# Patient Record
Sex: FEMALE | Race: WHITE | NOT HISPANIC OR LATINO | URBAN - METROPOLITAN AREA
[De-identification: names, ages, dates, MRNs, and addresses within clinical notes are randomized per-mention and may not be internally consistent; named-entity substitution may affect disease eponyms.]

---

## 2018-06-29 ENCOUNTER — OFFICE VISIT (OUTPATIENT)
Dept: URBAN - METROPOLITAN AREA CLINIC 48 | Facility: CLINIC | Age: 78
End: 2018-06-29
Payer: MEDICARE

## 2018-06-29 DIAGNOSIS — H35.3213 EXUDATIVE AGE-REL MCLR DEGN, RIGHT EYE, WITH INACTIVE SCAR: Primary | ICD-10-CM

## 2018-06-29 PROCEDURE — 99213 OFFICE O/P EST LOW 20 MIN: CPT | Performed by: OPHTHALMOLOGY

## 2018-06-29 PROCEDURE — 92134 CPTRZ OPH DX IMG PST SGM RTA: CPT | Performed by: OPHTHALMOLOGY

## 2018-06-29 ASSESSMENT — INTRAOCULAR PRESSURE
OD: 13
OS: 14

## 2018-06-29 NOTE — IMPRESSION/PLAN
Impression: Exdtve age-rel mclr degn, left eye, with inact chrdl neovas: H35.3222. OS. Condition: established, stable. Plan: OCT ordered and performed today,  Discussed diagnosis in detail with patient. Discussed treatment options with patient. Discussed risks and benefits and patient understands. Discussed possible injection at next visit. Decided to hold off this month and re -evaluate next month for possible continued injection.

## 2018-06-29 NOTE — IMPRESSION/PLAN
Impression: Exudative age-rel mclr degn, right eye, with inactive scar: H35.7169. Plan: OCT ordered and performed today. Discussed diagnosis with patient. 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 There is currently no effective treatment for sub retinal scarring from a choroidal  neovascular membrane. Recommend observation and amsler grid monitoring. To aide and detection of recurrent Choroidal Neovascularization.

## 2018-07-27 ENCOUNTER — OFFICE VISIT (OUTPATIENT)
Dept: URBAN - METROPOLITAN AREA CLINIC 48 | Facility: CLINIC | Age: 78
End: 2018-07-27
Payer: MEDICARE

## 2018-07-27 PROCEDURE — 92134 CPTRZ OPH DX IMG PST SGM RTA: CPT | Performed by: OPHTHALMOLOGY

## 2018-07-27 PROCEDURE — 99213 OFFICE O/P EST LOW 20 MIN: CPT | Performed by: OPHTHALMOLOGY

## 2018-07-27 ASSESSMENT — INTRAOCULAR PRESSURE
OS: 14
OD: 13

## 2018-07-27 NOTE — IMPRESSION/PLAN
Impression: Exudative age-rel mclr degn, right eye, with inactive scar: H35.4157. Plan: OCT ordered and performed today. Discussed diagnosis with patient. 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 There is currently no effective treatment for sub retinal scarring from a choroidal  neovascular membrane. Recommend observation and amsler grid monitoring. To aide and detection of recurrent Choroidal Neovascularization.

## 2018-07-27 NOTE — IMPRESSION/PLAN
Impression: Exdtve age-rel mclr degn, left eye, with inact chrdl neovas: H35.4782. Plan: OCT ordered and performed today. Discussed diagnosis with patient, OCT demonstrates pigment atrophy, Additional treatment is not recommended at this time but we will continue to monitor frequently. Patient to call if vision worsens. Patient to check vision one eye at a time. Patient understands and agrees with plan.

## 2018-10-05 ENCOUNTER — OFFICE VISIT (OUTPATIENT)
Dept: URBAN - METROPOLITAN AREA CLINIC 48 | Facility: CLINIC | Age: 78
End: 2018-10-05
Payer: MEDICARE

## 2018-10-05 PROCEDURE — 92134 CPTRZ OPH DX IMG PST SGM RTA: CPT | Performed by: OPHTHALMOLOGY

## 2018-10-05 PROCEDURE — 99213 OFFICE O/P EST LOW 20 MIN: CPT | Performed by: OPHTHALMOLOGY

## 2018-10-05 ASSESSMENT — INTRAOCULAR PRESSURE
OD: 14
OS: 15

## 2018-10-05 NOTE — IMPRESSION/PLAN
Impression: Exdtve age-rel mclr degn, left eye, with inact chrdl neovas: H35.9012. Plan: OCT ordered and performed today. Discussed diagnosis with patient, OCT demonstrates pigment atrophy, Additional treatment is not recommended at this time but we will continue to monitor frequently. Patient to call if vision worsens. Patient to check vision one eye at a time. Patient understands and agrees with plan.

## 2019-01-11 ENCOUNTER — OFFICE VISIT (OUTPATIENT)
Dept: URBAN - METROPOLITAN AREA CLINIC 48 | Facility: CLINIC | Age: 79
End: 2019-01-11
Payer: MEDICARE

## 2019-01-11 PROCEDURE — 99213 OFFICE O/P EST LOW 20 MIN: CPT | Performed by: OPHTHALMOLOGY

## 2019-01-11 PROCEDURE — 92134 CPTRZ OPH DX IMG PST SGM RTA: CPT | Performed by: OPHTHALMOLOGY

## 2019-01-11 PROCEDURE — 67028 INJECTION EYE DRUG: CPT | Performed by: OPHTHALMOLOGY

## 2019-01-11 ASSESSMENT — INTRAOCULAR PRESSURE
OD: 15
OS: 16

## 2019-01-11 NOTE — IMPRESSION/PLAN
Impression: Exudative age-rel mclr degn, right eye, with inactive scar: H35.7955. Plan: OCT ordered and performed today. Discussed diagnosis with patient. 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 There is currently no effective treatment for sub retinal scarring from a choroidal  neovascular membrane. Recommend observation and amsler grid monitoring. To aide and detection of recurrent Choroidal Neovascularization.

## 2019-01-11 NOTE — IMPRESSION/PLAN
Impression: Exdtve age-rel mclr degn, left eye, with actv chrdl neovas: H35.3221. Plan: OCT ordered and performed today. Discussed diagnosis in detail with patient. Discussed treatment options with patient. Discussed risks and benefits and patient understands. Recommend a one time Eylea injection in the left eye today. Discussed all the R/b/a's with patient and she elects to proceed with Eylea injection today in the left eye.

## 2019-03-08 ENCOUNTER — OFFICE VISIT (OUTPATIENT)
Dept: URBAN - METROPOLITAN AREA CLINIC 48 | Facility: CLINIC | Age: 79
End: 2019-03-08
Payer: MEDICARE

## 2019-03-08 PROCEDURE — 92134 CPTRZ OPH DX IMG PST SGM RTA: CPT | Performed by: OPHTHALMOLOGY

## 2019-03-08 PROCEDURE — 67028 INJECTION EYE DRUG: CPT | Performed by: OPHTHALMOLOGY

## 2019-03-08 PROCEDURE — 99213 OFFICE O/P EST LOW 20 MIN: CPT | Performed by: OPHTHALMOLOGY

## 2019-03-08 ASSESSMENT — INTRAOCULAR PRESSURE
OD: 14
OS: 12

## 2019-03-08 NOTE — IMPRESSION/PLAN
Impression: Exdtve age-rel mclr degn, left eye, with actv chrdl neovas: H35.3221. OS. Plan: OCT ordered and performed today. Discussed diagnosis in detail with patient. Discussed treatment options with patient. Discussed risks and benefits and patient understands. Recommend an Eylea injection in the left eye tpoday with a follow up in 8 weeks. All the R/b/a's were discussed with patient, patient elects to proceed with Eylea OS.

## 2019-03-08 NOTE — IMPRESSION/PLAN
Impression: Exudative age-rel mclr degn, right eye, with inactive scar: H35.7316. Plan: OCT ordered and performed today. Discussed diagnosis with patient. 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 There is currently no effective treatment for sub retinal scarring from a choroidal  neovascular membrane. Recommend observation and amsler grid monitoring. To aide and detection of recurrent Choroidal Neovascularization.

## 2019-05-03 ENCOUNTER — PROCEDURE (OUTPATIENT)
Dept: URBAN - METROPOLITAN AREA CLINIC 48 | Facility: CLINIC | Age: 79
End: 2019-05-03
Payer: MEDICARE

## 2019-05-03 PROCEDURE — 67028 INJECTION EYE DRUG: CPT | Performed by: OPHTHALMOLOGY

## 2019-07-03 ENCOUNTER — PROCEDURE (OUTPATIENT)
Dept: URBAN - METROPOLITAN AREA CLINIC 48 | Facility: CLINIC | Age: 79
End: 2019-07-03
Payer: MEDICARE

## 2019-07-03 PROCEDURE — 67028 INJECTION EYE DRUG: CPT | Performed by: OPHTHALMOLOGY

## 2019-09-06 ENCOUNTER — OFFICE VISIT (OUTPATIENT)
Dept: URBAN - METROPOLITAN AREA CLINIC 48 | Facility: CLINIC | Age: 79
End: 2019-09-06
Payer: MEDICARE

## 2019-09-06 PROCEDURE — 67028 INJECTION EYE DRUG: CPT | Performed by: OPHTHALMOLOGY

## 2019-09-06 PROCEDURE — 92134 CPTRZ OPH DX IMG PST SGM RTA: CPT | Performed by: OPHTHALMOLOGY

## 2019-09-06 PROCEDURE — 99213 OFFICE O/P EST LOW 20 MIN: CPT | Performed by: OPHTHALMOLOGY

## 2019-09-06 ASSESSMENT — INTRAOCULAR PRESSURE
OD: 15
OS: 12

## 2019-09-06 NOTE — IMPRESSION/PLAN
Impression: Exudative age-rel mclr degn, right eye, with inactive scar: H35.2547. Plan: OCT ordered and performed today. Discussed diagnosis with patient. 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 There is currently no effective treatment for sub retinal scarring from a choroidal  neovascular membrane. Recommend observation and amsler grid monitoring. To aide and detection of recurrent Choroidal Neovascularization.

## 2019-09-06 NOTE — IMPRESSION/PLAN
Impression: Exdtve age-rel mclr degn, left eye, with actv chrdl neovas: H35.3221. Plan: OCT ordered and performed today. Discussed diagnosis in detail with patient. Discussed treatment options with patient. Discussed risks and benefits and patient understands. A 3 month series of eylea Intirivitreal injection's, 1 EVERY MONTH  #1 today then #2 in 1 month, #3 in 2 month's, Then perform DE/OCT in 3 month's.

## 2019-10-04 ENCOUNTER — PROCEDURE (OUTPATIENT)
Dept: URBAN - METROPOLITAN AREA CLINIC 48 | Facility: CLINIC | Age: 79
End: 2019-10-04
Payer: MEDICARE

## 2019-10-04 PROCEDURE — 67028 INJECTION EYE DRUG: CPT | Performed by: OPHTHALMOLOGY

## 2019-11-15 ENCOUNTER — PROCEDURE (OUTPATIENT)
Dept: URBAN - METROPOLITAN AREA CLINIC 48 | Facility: CLINIC | Age: 79
End: 2019-11-15
Payer: MEDICARE

## 2019-11-15 PROCEDURE — 67028 INJECTION EYE DRUG: CPT | Performed by: OPHTHALMOLOGY

## 2019-12-13 ENCOUNTER — OFFICE VISIT (OUTPATIENT)
Dept: URBAN - METROPOLITAN AREA CLINIC 48 | Facility: CLINIC | Age: 79
End: 2019-12-13
Payer: MEDICARE

## 2019-12-13 DIAGNOSIS — H26.491 OTHER SECONDARY CATARACT, RIGHT EYE: ICD-10-CM

## 2019-12-13 PROCEDURE — 92134 CPTRZ OPH DX IMG PST SGM RTA: CPT | Performed by: OPHTHALMOLOGY

## 2019-12-13 PROCEDURE — 92014 COMPRE OPH EXAM EST PT 1/>: CPT | Performed by: OPHTHALMOLOGY

## 2019-12-13 ASSESSMENT — INTRAOCULAR PRESSURE
OD: 13
OS: 12

## 2019-12-13 NOTE — IMPRESSION/PLAN
Impression: Other secondary cataract, right eye: H26.491. Plan: Pending Anterior PCO, Dr. Caitlin Amador for a second opinion. 

RTC  6 months yag eval ( [facility] exam)

## 2019-12-13 NOTE — IMPRESSION/PLAN
Impression: Exdtve age-rel mclr degn, left eye, with actv chrdl neovas: H35.3221. Plan: Discussed diagnosis and OCT with patient. No injection today. Patient has appointment with Dr. Jose E Cody.

## 2020-01-08 ENCOUNTER — OFFICE VISIT (OUTPATIENT)
Dept: URBAN - METROPOLITAN AREA CLINIC 48 | Facility: CLINIC | Age: 80
End: 2020-01-08
Payer: MEDICARE

## 2020-01-08 PROCEDURE — 92134 CPTRZ OPH DX IMG PST SGM RTA: CPT | Performed by: OPHTHALMOLOGY

## 2020-01-08 PROCEDURE — 99213 OFFICE O/P EST LOW 20 MIN: CPT | Performed by: OPHTHALMOLOGY

## 2020-01-08 ASSESSMENT — INTRAOCULAR PRESSURE
OD: 12
OS: 12

## 2020-01-08 NOTE — IMPRESSION/PLAN
Impression: Exdtve age-rel mclr degn, left eye, with inact chrdl neovas: H35.3222.
s/p Eylea 11/15/2019 Plan: OCT ordered and performed today. Discussed diagnosis with patient, OCT demonstrates pigment atrophy, Additional treatment is not recommended at this time but we will continue to monitor frequently. Patient to call if vision worsens. Patient to check vision one eye at a time. Patient understands and agrees with plan.

## 2020-01-08 NOTE — IMPRESSION/PLAN
Impression: Exudative age-rel mclr degn, right eye, with inactive scar: H35.2633. little inter retina heme may pertain to vein occlusion  Plan: OCT ordered and performed today. Discussed diagnosis with patient. 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 There is currently no effective treatment for sub retinal scarring from a choroidal  neovascular membrane. Recommend observation and amsler grid monitoring. To aide and detection of recurrent Choroidal Neovascularization.

## 2020-02-21 ENCOUNTER — OFFICE VISIT (OUTPATIENT)
Dept: URBAN - METROPOLITAN AREA CLINIC 48 | Facility: CLINIC | Age: 80
End: 2020-02-21
Payer: MEDICARE

## 2020-02-21 PROCEDURE — 92134 CPTRZ OPH DX IMG PST SGM RTA: CPT | Performed by: OPHTHALMOLOGY

## 2020-02-21 PROCEDURE — 99213 OFFICE O/P EST LOW 20 MIN: CPT | Performed by: OPHTHALMOLOGY

## 2020-02-21 ASSESSMENT — INTRAOCULAR PRESSURE
OD: 15
OS: 13

## 2020-02-21 NOTE — IMPRESSION/PLAN
Impression: Exudative age-rel mclr degn, right eye, with inactive scar: H35.7473. little inter retina heme may pertain to vein occlusion Plan: OCT ordered and performed today. Discussed diagnosis with patient. 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 There is currently no effective treatment for sub retinal scarring from a choroidal  neovascular membrane. Recommend observation and amsler grid monitoring. To aide and detection of recurrent Choroidal Neovascularization.

## 2020-03-18 ENCOUNTER — OFFICE VISIT (OUTPATIENT)
Dept: URBAN - METROPOLITAN AREA CLINIC 48 | Facility: CLINIC | Age: 80
End: 2020-03-18
Payer: MEDICARE

## 2020-03-18 PROCEDURE — 92134 CPTRZ OPH DX IMG PST SGM RTA: CPT | Performed by: OPHTHALMOLOGY

## 2020-03-18 PROCEDURE — 67028 INJECTION EYE DRUG: CPT | Performed by: OPHTHALMOLOGY

## 2020-03-18 PROCEDURE — 99213 OFFICE O/P EST LOW 20 MIN: CPT | Performed by: OPHTHALMOLOGY

## 2020-03-18 ASSESSMENT — INTRAOCULAR PRESSURE
OS: 14
OD: 15

## 2020-03-18 NOTE — IMPRESSION/PLAN
Impression: Exdtve age-rel mclr degn, left eye, with actv chrdl neovas: H35.3221. OS.
S/p Eylea 11/2019 OS Plan: OCT ordered and performed today. Discussed diagnosis in detail with patient. Discussed treatment options with patient. Discussed risks and benefits and patient understands. Recommend a one time Eylea intravitreal injection in the left eye today. Patient understands and agrees with plan.

## 2020-03-18 NOTE — IMPRESSION/PLAN
Impression: Exudative age-rel mclr degn, right eye, with inactive scar: H35.5353. OD. Plan: OCT ordered and performed today. Discussed diagnosis with patient. 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 There is currently no effective treatment for sub retinal scarring from a choroidal  neovascular membrane. Recommend observation and amsler grid monitoring. To aide and detection of recurrent Choroidal Neovascularization.

## 2020-06-26 ENCOUNTER — OFFICE VISIT (OUTPATIENT)
Dept: URBAN - METROPOLITAN AREA CLINIC 48 | Facility: CLINIC | Age: 80
End: 2020-06-26
Payer: MEDICARE

## 2020-06-26 DIAGNOSIS — H35.3222 EXDTVE AGE-REL MCLR DEGN, LEFT EYE, WITH INACT CHRDL NEOVAS: Primary | ICD-10-CM

## 2020-06-26 PROCEDURE — 99213 OFFICE O/P EST LOW 20 MIN: CPT | Performed by: OPHTHALMOLOGY

## 2020-06-26 PROCEDURE — 92134 CPTRZ OPH DX IMG PST SGM RTA: CPT | Performed by: OPHTHALMOLOGY

## 2020-06-26 ASSESSMENT — INTRAOCULAR PRESSURE
OS: 14
OD: 14

## 2020-06-26 NOTE — IMPRESSION/PLAN
Impression: Exudative age-rel mclr degn, right eye, with inactive scar: H35.3213. little inter retina heme may pertain to vein occlusion Plan: OCT ordered and performed today. Discussed diagnosis with patient. 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 There is currently no effective treatment for sub retinal scarring from a choroidal  neovascular membrane. Recommend observation and amsler grid monitoring.

## 2020-06-26 NOTE — IMPRESSION/PLAN
Impression: Exdtve age-rel mclr degn, left eye, with inact chrdl neovas: H35.7662.
s/p Eylea Plan: OCT ordered and performed today. Discussed diagnosis with patient, OCT demonstrates pigment atrophy, Additional treatment is not recommended at this time but we will continue to monitor frequently. Patient to call if vision worsens. Patient to check vision one eye at a time. Patient understands and agrees with plan.

## 2020-10-30 ENCOUNTER — OFFICE VISIT (OUTPATIENT)
Dept: URBAN - METROPOLITAN AREA CLINIC 48 | Facility: CLINIC | Age: 80
End: 2020-10-30
Payer: MEDICARE

## 2020-10-30 PROCEDURE — 67028 INJECTION EYE DRUG: CPT | Performed by: OPHTHALMOLOGY

## 2020-10-30 PROCEDURE — 92134 CPTRZ OPH DX IMG PST SGM RTA: CPT | Performed by: OPHTHALMOLOGY

## 2020-10-30 PROCEDURE — 99213 OFFICE O/P EST LOW 20 MIN: CPT | Performed by: OPHTHALMOLOGY

## 2020-10-30 ASSESSMENT — INTRAOCULAR PRESSURE
OD: 12
OS: 16

## 2020-10-30 NOTE — IMPRESSION/PLAN
Impression: Exdtve age-rel mclr degn, left eye, with actv chrdl neovas: H35.3221. OS. Plan: OCT ordered and performed today. Discussed diagnosis in detail with patient. Discussed treatment options with patient. Discussed risks and benefits and patient understands. Recommend an one time Eylea injection in the left eye today, patient elects to proceed with Eylea OS.

## 2020-12-11 ENCOUNTER — OFFICE VISIT (OUTPATIENT)
Dept: URBAN - METROPOLITAN AREA CLINIC 48 | Facility: CLINIC | Age: 80
End: 2020-12-11
Payer: MEDICARE

## 2020-12-11 PROCEDURE — 67028 INJECTION EYE DRUG: CPT | Performed by: OPHTHALMOLOGY

## 2020-12-11 PROCEDURE — 99213 OFFICE O/P EST LOW 20 MIN: CPT | Performed by: OPHTHALMOLOGY

## 2020-12-11 PROCEDURE — 92134 CPTRZ OPH DX IMG PST SGM RTA: CPT | Performed by: OPHTHALMOLOGY

## 2020-12-11 ASSESSMENT — INTRAOCULAR PRESSURE
OD: 14
OS: 12

## 2020-12-11 NOTE — IMPRESSION/PLAN
Impression: Exudative age-related macular degeneration, left eye, with active choroidal neovascularization: H35.3221. Left. Plan: OCT ordered and performed today. Discussed diagnosis in detail with patient. Discussed treatment options with patient. Discussed risks and benefits and patient understands. A 3 month series of Eylea intravitreal injection's in the left eye, 1 EVERY MONTH  #1 today then #2 in 1 month, #3 in 2 month's, Then perform DE/OCT in 3 month's.

## 2021-01-22 ENCOUNTER — PROCEDURE (OUTPATIENT)
Dept: URBAN - METROPOLITAN AREA CLINIC 48 | Facility: CLINIC | Age: 81
End: 2021-01-22
Payer: MEDICARE

## 2021-01-22 PROCEDURE — 67028 INJECTION EYE DRUG: CPT | Performed by: OPHTHALMOLOGY

## 2021-02-19 ENCOUNTER — PROCEDURE (OUTPATIENT)
Dept: URBAN - METROPOLITAN AREA CLINIC 48 | Facility: CLINIC | Age: 81
End: 2021-02-19
Payer: MEDICARE

## 2021-02-19 PROCEDURE — 67028 INJECTION EYE DRUG: CPT | Performed by: OPHTHALMOLOGY

## 2021-03-31 ENCOUNTER — OFFICE VISIT (OUTPATIENT)
Dept: URBAN - METROPOLITAN AREA CLINIC 48 | Facility: CLINIC | Age: 81
End: 2021-03-31
Payer: MEDICARE

## 2021-03-31 PROCEDURE — 92134 CPTRZ OPH DX IMG PST SGM RTA: CPT | Performed by: OPHTHALMOLOGY

## 2021-03-31 PROCEDURE — 99213 OFFICE O/P EST LOW 20 MIN: CPT | Performed by: OPHTHALMOLOGY

## 2021-03-31 PROCEDURE — 67028 INJECTION EYE DRUG: CPT | Performed by: OPHTHALMOLOGY

## 2021-03-31 ASSESSMENT — INTRAOCULAR PRESSURE
OS: 10
OD: 17

## 2021-03-31 NOTE — IMPRESSION/PLAN
Impression: Exudative age-related macular degeneration, left eye, with active choroidal neovascularization: H35.3221. Left. Plan: OCT ordered and performed today. Discussed diagnosis in detail with patient. Discussed treatment options with patient. Discussed risks and benefits and patient understands. A 3 month series of Eylea Intravitreal injection's OS, 1 EVERY MONTH  #1 today then #2 in 1 month, #3 in 2 month's, Then perform DE/OCT in 3 month's.

## 2021-04-30 ENCOUNTER — PROCEDURE (OUTPATIENT)
Dept: URBAN - METROPOLITAN AREA CLINIC 48 | Facility: CLINIC | Age: 81
End: 2021-04-30
Payer: MEDICARE

## 2021-04-30 PROCEDURE — 67028 INJECTION EYE DRUG: CPT | Performed by: OPHTHALMOLOGY

## 2021-06-30 ENCOUNTER — PROCEDURE (OUTPATIENT)
Dept: URBAN - METROPOLITAN AREA CLINIC 48 | Facility: CLINIC | Age: 81
End: 2021-06-30
Payer: MEDICARE

## 2021-06-30 PROCEDURE — 67028 INJECTION EYE DRUG: CPT | Performed by: OPHTHALMOLOGY

## 2021-09-17 ENCOUNTER — OFFICE VISIT (OUTPATIENT)
Dept: URBAN - METROPOLITAN AREA CLINIC 48 | Facility: CLINIC | Age: 81
End: 2021-09-17
Payer: MEDICARE

## 2021-09-17 PROCEDURE — 67028 INJECTION EYE DRUG: CPT | Performed by: OPHTHALMOLOGY

## 2021-09-17 PROCEDURE — 92134 CPTRZ OPH DX IMG PST SGM RTA: CPT | Performed by: OPHTHALMOLOGY

## 2021-09-17 PROCEDURE — 99213 OFFICE O/P EST LOW 20 MIN: CPT | Performed by: OPHTHALMOLOGY

## 2021-09-17 ASSESSMENT — INTRAOCULAR PRESSURE
OD: 16
OS: 14

## 2021-09-17 NOTE — IMPRESSION/PLAN
Impression: Exdtve age-rel mclr degn, left eye, with actv chrdl neovas: H35.3221. Left. Plan: OCT ordered and performed today. Discussed diagnosis in detail with patient. Discussed treatment options with patient. Discussed risks and benefits and patient understands. A 3 month series of Eylea intravitreal injection's OS, 1 EVERY MONTH  #1 today then #2 in 1 month, #3 in 2 month's, Then perform DE/OCT in 3 month's.

## 2021-11-12 ENCOUNTER — OFFICE VISIT (OUTPATIENT)
Dept: URBAN - METROPOLITAN AREA CLINIC 48 | Facility: CLINIC | Age: 81
End: 2021-11-12
Payer: MEDICARE

## 2021-11-12 DIAGNOSIS — H35.3221 EXUDATIVE AGE-RELATED MACULAR DEGENERATION, LEFT EYE, WITH ACTIVE CHOROIDAL NEOVASCULARIZATION: Primary | ICD-10-CM

## 2021-11-12 PROCEDURE — 92134 CPTRZ OPH DX IMG PST SGM RTA: CPT | Performed by: OPHTHALMOLOGY

## 2021-11-12 PROCEDURE — 99213 OFFICE O/P EST LOW 20 MIN: CPT | Performed by: OPHTHALMOLOGY

## 2021-11-12 PROCEDURE — 67028 INJECTION EYE DRUG: CPT | Performed by: OPHTHALMOLOGY

## 2021-11-12 ASSESSMENT — INTRAOCULAR PRESSURE
OD: 15
OS: 14

## 2021-11-12 NOTE — IMPRESSION/PLAN
Impression: Exudative age-related macular degeneration, left eye, with active choroidal neovascularization: H35.3221. Left. S/p Eylea OS 9/17/21 Plan: OCT ordered and performed today. Discussed diagnosis in detail with patient. Discussed treatment options with patient. Discussed risks and benefits and patient understands. A 3 month series of Eylea intravitreal injection's left eye, 1 EVERY MONTH  #1 today then #2 in 1 month, #3 in 2 month's, Then perform DE/OCT in 3 month's.

## 2021-11-12 NOTE — IMPRESSION/PLAN
Impression: Exudative age-rel mclr degn, right eye, with inactive scar: H35.6993. little inter retina heme may pertain to vein occlusion Plan: OCT ordered and performed today. Discussed diagnosis with patient. 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 There is currently no effective treatment for sub retinal scarring from a choroidal  neovascular membrane.  Recommend observation

## 2021-12-10 ENCOUNTER — PROCEDURE (OUTPATIENT)
Dept: URBAN - METROPOLITAN AREA CLINIC 48 | Facility: CLINIC | Age: 81
End: 2021-12-10
Payer: MEDICARE

## 2021-12-10 PROCEDURE — 67028 INJECTION EYE DRUG: CPT | Performed by: OPHTHALMOLOGY

## 2022-02-04 ENCOUNTER — PROCEDURE (OUTPATIENT)
Dept: URBAN - METROPOLITAN AREA CLINIC 48 | Facility: CLINIC | Age: 82
End: 2022-02-04
Payer: MEDICARE

## 2022-02-04 PROCEDURE — 67028 INJECTION EYE DRUG: CPT | Performed by: OPHTHALMOLOGY

## 2022-03-04 ENCOUNTER — OFFICE VISIT (OUTPATIENT)
Dept: URBAN - METROPOLITAN AREA CLINIC 48 | Facility: CLINIC | Age: 82
End: 2022-03-04
Payer: MEDICARE

## 2022-03-04 PROCEDURE — 99213 OFFICE O/P EST LOW 20 MIN: CPT | Performed by: OPHTHALMOLOGY

## 2022-03-04 PROCEDURE — 92134 CPTRZ OPH DX IMG PST SGM RTA: CPT | Performed by: OPHTHALMOLOGY

## 2022-03-04 PROCEDURE — 67028 INJECTION EYE DRUG: CPT | Performed by: OPHTHALMOLOGY

## 2022-03-04 ASSESSMENT — INTRAOCULAR PRESSURE
OS: 14
OD: 18

## 2022-03-04 NOTE — IMPRESSION/PLAN
Impression: Exudative age-related macular degeneration, left eye, with active choroidal neovascularization: H35.3221. Left. S/p Eylea OS 2/4/22 Plan: OCT ordered and performed today. Discussed diagnosis in detail with patient. Discussed treatment options with patient. Discussed risks and benefits and patient understands. A 3 month series of Eylea intravitreal injection's left eye, 1 EVERY MONTH  #1 today then #2 in 1 month, #3 in 2 month's, Then perform DE/OCT in 3 month's.

## 2022-03-04 NOTE — IMPRESSION/PLAN
Impression: Exudative age-rel mclr degn, right eye, with inactive scar: H35.2993. little inter retina heme may pertain to vein occlusion Plan: OCT ordered and performed today. Discussed diagnosis with patient. 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 There is currently no effective treatment for sub retinal scarring from a choroidal  neovascular membrane.  Recommend observation

## 2022-05-16 ENCOUNTER — OFFICE VISIT (OUTPATIENT)
Dept: URBAN - METROPOLITAN AREA CLINIC 48 | Facility: CLINIC | Age: 82
End: 2022-05-16
Payer: MEDICARE

## 2022-05-16 DIAGNOSIS — H35.3221 EXUDATIVE AGE-RELATED MACULAR DEGENERATION, LEFT EYE, WITH ACTIVE CHOROIDAL NEOVASCULARIZATION: Primary | ICD-10-CM

## 2022-05-16 DIAGNOSIS — H35.3213 EXUDATIVE AGE-REL MCLR DEGN, RIGHT EYE, WITH INACTIVE SCAR: ICD-10-CM

## 2022-05-16 PROCEDURE — 67028 INJECTION EYE DRUG: CPT | Performed by: OPHTHALMOLOGY

## 2022-05-16 PROCEDURE — 99214 OFFICE O/P EST MOD 30 MIN: CPT | Performed by: OPHTHALMOLOGY

## 2022-05-16 PROCEDURE — 92134 CPTRZ OPH DX IMG PST SGM RTA: CPT | Performed by: OPHTHALMOLOGY

## 2022-05-16 ASSESSMENT — INTRAOCULAR PRESSURE
OS: 6
OD: 8

## 2022-05-16 NOTE — IMPRESSION/PLAN
Impression: Exudative age-related macular degeneration, left eye, with active choroidal neovascularization: H35.3221. Left. Zay Quick 03/04/22 OCT: 05/16/22 OD: atrophy OS: PED Plan: The diagnosis, natural history, and prognosis of wet AMD, as well as the risks and benefits of various treatment options including laser, PDT, Avastin, Lucentis and Eylea; along with the alternatives of observation or participation in a clinical trial, were discussed at length. The patient understands that treatment may not improve vision, but should reduce the risk of further visual loss. The patient understands that smoking is the most significant modifiable risk factor for the development of advanced AMD, and also understands that if they do smoke (or have history of smoking in the past 5 years), they cannot take vitamin A/beta-carotene, since it may increase their risk for lung cancer. Given stability of vision and exam, pt elects to proceed with Eylea.  

RTC 8 weeks Eylea OS #2/3

## 2022-05-16 NOTE — IMPRESSION/PLAN
Impression: Exudative age-rel mclr myra, right eye, with inactive scar: H35.2359. little inter retina heme may pertain to vein occlusion Plan: The patient has a disciform scar. I recommend observation as there is currently no effective treatment for subretinal scarring from a choroidal neovascular membrane (CNVM). The patient understands that further treatment would only be indicated to prevent enlargement of the central scotoma. I recommend the patient consider low vision aids, and we discussed the importance of yearly dilated eye exams.

## 2022-07-11 ENCOUNTER — PROCEDURE (OUTPATIENT)
Dept: URBAN - METROPOLITAN AREA CLINIC 48 | Facility: CLINIC | Age: 82
End: 2022-07-11
Payer: MEDICARE

## 2022-07-11 DIAGNOSIS — H35.3221 EXUDATIVE AGE-RELATED MACULAR DEGENERATION, LEFT EYE, WITH ACTIVE CHOROIDAL NEOVASCULARIZATION: Primary | ICD-10-CM

## 2022-07-11 PROCEDURE — 67028 INJECTION EYE DRUG: CPT | Performed by: OPHTHALMOLOGY

## 2022-07-11 RX ORDER — APIXABAN 5 MG/1
5 MG TABLET, FILM COATED ORAL
Qty: 0 | Refills: 0 | Status: ACTIVE
Start: 2022-07-11

## 2023-09-18 ENCOUNTER — OFFICE VISIT (OUTPATIENT)
Dept: URBAN - METROPOLITAN AREA CLINIC 47 | Facility: CLINIC | Age: 83
End: 2023-09-18
Payer: MEDICARE

## 2023-09-18 DIAGNOSIS — H35.3233 EXUDATIVE AGE-RELATED MACULAR DEGENERATION, BILATERAL, WITH INACTIVE SCAR: Primary | ICD-10-CM

## 2023-09-18 PROCEDURE — 99204 OFFICE O/P NEW MOD 45 MIN: CPT | Performed by: OPHTHALMOLOGY

## 2023-09-18 PROCEDURE — 92134 CPTRZ OPH DX IMG PST SGM RTA: CPT | Performed by: OPHTHALMOLOGY

## 2023-09-18 ASSESSMENT — INTRAOCULAR PRESSURE
OS: 5
OD: 8